# Patient Record
Sex: FEMALE | Race: WHITE
[De-identification: names, ages, dates, MRNs, and addresses within clinical notes are randomized per-mention and may not be internally consistent; named-entity substitution may affect disease eponyms.]

---

## 2017-03-30 ENCOUNTER — HOSPITAL ENCOUNTER (OUTPATIENT)
Dept: HOSPITAL 79 - KOH-I | Age: 72
End: 2017-03-30
Attending: NURSE PRACTITIONER
Payer: MEDICARE

## 2017-03-30 DIAGNOSIS — Z87.891: Primary | ICD-10-CM

## 2017-03-30 PROCEDURE — G0297 LDCT FOR LUNG CA SCREEN: HCPCS

## 2018-06-25 ENCOUNTER — HOSPITAL ENCOUNTER (OUTPATIENT)
Dept: MAMMOGRAPHY | Facility: HOSPITAL | Age: 73
Discharge: HOME | End: 2018-06-25

## 2018-07-26 ENCOUNTER — HOSPITAL ENCOUNTER (OUTPATIENT)
Dept: MAMMOGRAPHY | Facility: HOSPITAL | Age: 73
Discharge: HOME | End: 2018-07-26
Admitting: INTERNAL MEDICINE

## 2018-07-26 DIAGNOSIS — Z12.39 BREAST CANCER SCREENING: ICD-10-CM

## 2018-07-26 PROCEDURE — 77063 BREAST TOMOSYNTHESIS BI: CPT | Performed by: RADIOLOGY

## 2018-07-26 PROCEDURE — 77067 SCR MAMMO BI INCL CAD: CPT

## 2018-07-26 PROCEDURE — 77067 SCR MAMMO BI INCL CAD: CPT | Performed by: RADIOLOGY

## 2018-07-26 PROCEDURE — 77063 BREAST TOMOSYNTHESIS BI: CPT

## 2021-07-02 ENCOUNTER — HOSPITAL ENCOUNTER (OUTPATIENT)
Dept: MAMMOGRAPHY | Facility: HOSPITAL | Age: 76
Discharge: HOME | End: 2021-07-02
Admitting: INTERNAL MEDICINE

## 2021-07-02 DIAGNOSIS — Z12.31 VISIT FOR SCREENING MAMMOGRAM: ICD-10-CM

## 2021-07-02 PROCEDURE — 77063 BREAST TOMOSYNTHESIS BI: CPT

## 2021-07-02 PROCEDURE — 77063 BREAST TOMOSYNTHESIS BI: CPT | Performed by: RADIOLOGY

## 2021-07-02 PROCEDURE — 77067 SCR MAMMO BI INCL CAD: CPT

## 2021-07-02 PROCEDURE — 77067 SCR MAMMO BI INCL CAD: CPT | Performed by: RADIOLOGY

## 2023-11-13 ENCOUNTER — TRANSCRIBE ORDERS (OUTPATIENT)
Dept: ADMINISTRATIVE | Facility: HOSPITAL | Age: 78
End: 2023-11-13
Payer: MEDICARE

## 2023-11-13 DIAGNOSIS — Z12.31 VISIT FOR SCREENING MAMMOGRAM: Primary | ICD-10-CM

## 2023-12-04 ENCOUNTER — HOSPITAL ENCOUNTER (OUTPATIENT)
Dept: MAMMOGRAPHY | Facility: HOSPITAL | Age: 78
Discharge: HOME | End: 2023-12-04
Admitting: INTERNAL MEDICINE
Payer: MEDICARE

## 2023-12-04 DIAGNOSIS — Z12.31 VISIT FOR SCREENING MAMMOGRAM: ICD-10-CM

## 2023-12-04 PROCEDURE — 77067 SCR MAMMO BI INCL CAD: CPT

## 2023-12-04 PROCEDURE — 77063 BREAST TOMOSYNTHESIS BI: CPT

## 2024-01-03 ENCOUNTER — HOSPITAL ENCOUNTER (OUTPATIENT)
Dept: ULTRASOUND IMAGING | Facility: HOSPITAL | Age: 79
Discharge: HOME | End: 2024-01-03
Payer: MEDICARE

## 2024-01-03 ENCOUNTER — HOSPITAL ENCOUNTER (OUTPATIENT)
Dept: MAMMOGRAPHY | Facility: HOSPITAL | Age: 79
Discharge: HOME | End: 2024-01-03
Payer: MEDICARE

## 2024-01-03 DIAGNOSIS — R92.8 ABNORMAL MAMMOGRAM OF RIGHT BREAST: ICD-10-CM

## 2024-01-03 PROCEDURE — 76642 ULTRASOUND BREAST LIMITED: CPT

## 2024-01-03 PROCEDURE — 77065 DX MAMMO INCL CAD UNI: CPT

## 2024-01-03 PROCEDURE — G0279 TOMOSYNTHESIS, MAMMO: HCPCS

## 2024-01-11 ENCOUNTER — HOSPITAL ENCOUNTER (OUTPATIENT)
Dept: ULTRASOUND IMAGING | Facility: HOSPITAL | Age: 79
Discharge: HOME | End: 2024-01-11
Payer: MEDICARE

## 2024-01-11 ENCOUNTER — HOSPITAL ENCOUNTER (OUTPATIENT)
Dept: MAMMOGRAPHY | Facility: HOSPITAL | Age: 79
Discharge: HOME | End: 2024-01-11
Payer: MEDICARE

## 2024-01-11 DIAGNOSIS — R92.8 ABNORMAL MAMMOGRAM OF RIGHT BREAST: ICD-10-CM

## 2024-01-11 PROCEDURE — A4648 IMPLANTABLE TISSUE MARKER: HCPCS

## 2024-01-15 LAB — REF LAB TEST METHOD: NORMAL

## 2024-01-17 ENCOUNTER — TELEPHONE (OUTPATIENT)
Dept: MAMMOGRAPHY | Facility: HOSPITAL | Age: 79
End: 2024-01-17
Payer: MEDICARE

## 2024-01-17 NOTE — TELEPHONE ENCOUNTER
Attempted patient with biopsy results with no answer. 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    The pathology result is felt to be concordant with the imaging findings.    Recommend 6-month follow-up diagnostic right mammogram.    The patient will be notified of the results and recommendations by our breast care nurse.

## 2024-01-23 ENCOUNTER — TELEPHONE (OUTPATIENT)
Dept: MAMMOGRAPHY | Facility: HOSPITAL | Age: 79
End: 2024-01-23
Payer: MEDICARE

## 2024-01-23 NOTE — TELEPHONE ENCOUNTER
1-23-24 09:00 Attempted to notify patient of breast biopsy results and recommendations, no answer. Will attempt again at later time.

## 2024-01-23 NOTE — TELEPHONE ENCOUNTER
Pt called wanting results and I informed her of Dr. Abdul recommendation of 6 month f/u.  She had no questions and will come back for f/u/

## 2024-09-26 ENCOUNTER — HOSPITAL ENCOUNTER (OUTPATIENT)
Dept: MAMMOGRAPHY | Facility: HOSPITAL | Age: 79
Discharge: HOME | End: 2024-09-26
Admitting: RADIOLOGY
Payer: MEDICARE

## 2024-09-26 DIAGNOSIS — R92.8 ABNORMAL MAMMOGRAM: ICD-10-CM

## 2024-09-26 PROCEDURE — 77065 DX MAMMO INCL CAD UNI: CPT

## 2024-09-26 PROCEDURE — G0279 TOMOSYNTHESIS, MAMMO: HCPCS
